# Patient Record
Sex: FEMALE | Race: OTHER | HISPANIC OR LATINO | URBAN - METROPOLITAN AREA
[De-identification: names, ages, dates, MRNs, and addresses within clinical notes are randomized per-mention and may not be internally consistent; named-entity substitution may affect disease eponyms.]

---

## 2018-04-11 ENCOUNTER — OUTPATIENT (OUTPATIENT)
Dept: OUTPATIENT SERVICES | Facility: HOSPITAL | Age: 38
LOS: 1 days | Discharge: HOME | End: 2018-04-11

## 2018-04-11 ENCOUNTER — RESULT REVIEW (OUTPATIENT)
Age: 38
End: 2018-04-11

## 2018-04-13 LAB — SURGICAL PATHOLOGY STUDY: SIGNIFICANT CHANGE UP

## 2018-05-26 ENCOUNTER — EMERGENCY (EMERGENCY)
Facility: HOSPITAL | Age: 38
LOS: 1 days | Discharge: HOME | End: 2018-05-26
Admitting: EMERGENCY MEDICINE
Payer: COMMERCIAL

## 2018-05-26 VITALS
HEART RATE: 97 BPM | SYSTOLIC BLOOD PRESSURE: 155 MMHG | TEMPERATURE: 98 F | RESPIRATION RATE: 18 BRPM | WEIGHT: 139.99 LBS | DIASTOLIC BLOOD PRESSURE: 99 MMHG | OXYGEN SATURATION: 96 %

## 2018-05-26 VITALS
HEART RATE: 78 BPM | DIASTOLIC BLOOD PRESSURE: 93 MMHG | SYSTOLIC BLOOD PRESSURE: 138 MMHG | OXYGEN SATURATION: 98 % | RESPIRATION RATE: 16 BRPM | TEMPERATURE: 98 F

## 2018-05-26 DIAGNOSIS — Z79.899 OTHER LONG TERM (CURRENT) DRUG THERAPY: ICD-10-CM

## 2018-05-26 DIAGNOSIS — M51.26 OTHER INTERVERTEBRAL DISC DISPLACEMENT, LUMBAR REGION: ICD-10-CM

## 2018-05-26 DIAGNOSIS — M54.5 LOW BACK PAIN: ICD-10-CM

## 2018-05-26 DIAGNOSIS — Z79.891 LONG TERM (CURRENT) USE OF OPIATE ANALGESIC: ICD-10-CM

## 2018-05-26 DIAGNOSIS — Z79.1 LONG TERM (CURRENT) USE OF NON-STEROIDAL ANTI-INFLAMMATORIES (NSAID): ICD-10-CM

## 2018-05-26 DIAGNOSIS — M54.9 DORSALGIA, UNSPECIFIED: ICD-10-CM

## 2018-05-26 PROCEDURE — 72148 MRI LUMBAR SPINE W/O DYE: CPT | Mod: 26

## 2018-05-26 PROCEDURE — 72148 MRI LUMBAR SPINE W/O DYE: CPT

## 2018-05-26 PROCEDURE — 99284 EMERGENCY DEPT VISIT MOD MDM: CPT | Mod: 25

## 2018-05-26 PROCEDURE — 96374 THER/PROPH/DIAG INJ IV PUSH: CPT | Mod: XU

## 2018-05-26 PROCEDURE — 99284 EMERGENCY DEPT VISIT MOD MDM: CPT

## 2018-05-26 PROCEDURE — 96372 THER/PROPH/DIAG INJ SC/IM: CPT | Mod: XU

## 2018-05-26 PROCEDURE — 72131 CT LUMBAR SPINE W/O DYE: CPT

## 2018-05-26 PROCEDURE — 72131 CT LUMBAR SPINE W/O DYE: CPT | Mod: 26

## 2018-05-26 RX ORDER — DIAZEPAM 5 MG
1 TABLET ORAL
Qty: 9 | Refills: 0 | OUTPATIENT
Start: 2018-05-26 | End: 2018-05-28

## 2018-05-26 RX ORDER — IBUPROFEN 200 MG
1 TABLET ORAL
Qty: 0 | Refills: 0 | COMMUNITY

## 2018-05-26 RX ORDER — DIAZEPAM 5 MG
1 TABLET ORAL
Qty: 0 | Refills: 0 | COMMUNITY

## 2018-05-26 RX ORDER — MORPHINE SULFATE 50 MG/1
4 CAPSULE, EXTENDED RELEASE ORAL ONCE
Qty: 0 | Refills: 0 | Status: DISCONTINUED | OUTPATIENT
Start: 2018-05-26 | End: 2018-05-26

## 2018-05-26 RX ORDER — KETOROLAC TROMETHAMINE 30 MG/ML
30 SYRINGE (ML) INJECTION ONCE
Qty: 0 | Refills: 0 | Status: DISCONTINUED | OUTPATIENT
Start: 2018-05-26 | End: 2018-05-26

## 2018-05-26 RX ORDER — ACETAMINOPHEN 500 MG
650 TABLET ORAL ONCE
Qty: 0 | Refills: 0 | Status: COMPLETED | OUTPATIENT
Start: 2018-05-26 | End: 2018-05-26

## 2018-05-26 RX ORDER — DEXAMETHASONE 0.5 MG/5ML
8 ELIXIR ORAL ONCE
Qty: 0 | Refills: 0 | Status: COMPLETED | OUTPATIENT
Start: 2018-05-26 | End: 2018-05-26

## 2018-05-26 RX ORDER — CYCLOBENZAPRINE HYDROCHLORIDE 10 MG/1
1 TABLET, FILM COATED ORAL
Qty: 0 | Refills: 0 | COMMUNITY

## 2018-05-26 RX ADMIN — Medication 30 MILLIGRAM(S): at 10:58

## 2018-05-26 RX ADMIN — Medication 650 MILLIGRAM(S): at 15:58

## 2018-05-26 RX ADMIN — Medication 8 MILLIGRAM(S): at 18:21

## 2018-05-26 RX ADMIN — Medication 30 MILLIGRAM(S): at 10:28

## 2018-05-26 RX ADMIN — Medication 650 MILLIGRAM(S): at 16:38

## 2018-05-26 RX ADMIN — MORPHINE SULFATE 4 MILLIGRAM(S): 50 CAPSULE, EXTENDED RELEASE ORAL at 10:58

## 2018-05-26 RX ADMIN — MORPHINE SULFATE 4 MILLIGRAM(S): 50 CAPSULE, EXTENDED RELEASE ORAL at 17:17

## 2018-05-26 RX ADMIN — MORPHINE SULFATE 4 MILLIGRAM(S): 50 CAPSULE, EXTENDED RELEASE ORAL at 10:28

## 2018-05-26 NOTE — ED ADULT NURSE NOTE - OBJECTIVE STATEMENT
aggravated herniated disc problem with lifting resulting in back pain for over a week, taking valium, oxycodone, ibuprofen and cyclobenzaprine since prescribed at another hospital on Thursday- not better--painful to walk with pain and spasms shooting down left leg-- has been seeing a chiropractor , and is supposed to go to PT next week; has no ortho or neuro MD

## 2018-05-26 NOTE — ED PROVIDER NOTE - NSTIMEPROVIDERCAREINITIATE_GEN_ER
Epidural Procedure Note    Staff:     Anesthesiologist:  BIPIN PIERCE    Resident/CRNA:  NIVIA ROSAS    Procedure performed by resident/CRNA in the presence of a teaching physician    Location: OB and floor     Procedure start time:  5/21/2018 8:15 PM     Procedure end time:  5/21/2018 8:23 PM   Pre-procedure checklist:   patient identified, IV checked, site marked, risks and benefits discussed, informed consent, monitors and equipment checked, pre-op evaluation, at physician/surgeon's request and post-op pain management      Correct Patient: Yes      Correct Position: Yes      Correct Site: Yes      Correct Procedure: Yes      Correct Laterality:  Yes    Site Marked:  Yes  Procedure:     Procedure:  Epidural catheter    ASA:  2    Position:  Sitting    Sterile Prep: Betadine, mask, sterile gloves and patient draped      Insertion site:  L3-4    Local skin infiltration:  1% lidocaine    amount (mL):  3    Approach:  Midline    Needle gauge (G):  17    Block Needle Type:  Touhy    Injection Technique:  LORT saline    STEPHAN at (cm):  6    Attempts:  1    Redirects:  0    Catheter gauge (G):  19    Catheter threaded easily: Yes      Threaded to cm at skin:  10    Threaded in epidural space (cm):  4    Paresthesias:  No    Aspiration negative for Heme or CSF: Yes      Test dose (mL):  3     Local anesthetic:  Lidocaine 1.5% w/ 1:200,000 epinephrine    Test dose time:  20:23    Test dose negative for signs of intravascular, subdural or intrathecal injection: Yes          
26-May-2018 08:45

## 2018-05-26 NOTE — ED PROVIDER NOTE - CHPI ED SYMPTOMS NEG
no numbness/no difficulty bearing weight/no bladder dysfunction/no bowel dysfunction/no motor function loss

## 2018-05-26 NOTE — ED PROVIDER NOTE - OBJECTIVE STATEMENT
39 y/o f with h/o herniated disc c/o back pain for one week. She state of bending over and felt a sudden pain. She was doing a lot of movement moving stuff around the house.  Pain has radiating down her left leg. Denies fall, incontinence, abd pain, dysuria, fever, paresis. No prior spinal surgery. She was seen by two different doctors and was given steroids, NSAIDS, percocet, and muscle relaxer with no relief. She took two days of steroids.

## 2018-05-26 NOTE — ED ADULT TRIAGE NOTE - CHIEF COMPLAINT QUOTE
Patient c/o lower back pain after lifting heavy object 1 week ago , went to other hosp last thurs , medicine was prescribed with no relief . History of herniated disc . Patient c/o lower back pain after lifting heavy object 1 week ago , went to other hosp last thurs , medicine was prescribed with no relief . History of herniated disc . Denies any urinary nor bowel incontinence .

## 2018-05-26 NOTE — ED ADULT NURSE NOTE - CHIEF COMPLAINT QUOTE
Patient c/o lower back pain after lifting heavy object 1 week ago , went to other hosp last thurs , medicine was prescribed with no relief . History of herniated disc . Denies any urinary nor bowel incontinence .

## 2018-05-26 NOTE — ED PROVIDER NOTE - MEDICAL DECISION MAKING DETAILS
Patient with herniated disc no spinal compromise able to walk. No motor or sensory deficit. Recommend f/u with spinal surgeon. Pain better controlled.

## 2018-11-29 PROBLEM — Z00.00 ENCOUNTER FOR PREVENTIVE HEALTH EXAMINATION: Status: ACTIVE | Noted: 2018-11-29

## 2019-03-04 ENCOUNTER — APPOINTMENT (OUTPATIENT)
Dept: HUMAN REPRODUCTION | Facility: CLINIC | Age: 39
End: 2019-03-04
